# Patient Record
Sex: MALE | Race: WHITE | Employment: STUDENT | URBAN - METROPOLITAN AREA
[De-identification: names, ages, dates, MRNs, and addresses within clinical notes are randomized per-mention and may not be internally consistent; named-entity substitution may affect disease eponyms.]

---
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
## 2024-07-25 ENCOUNTER — NURSE TRIAGE (OUTPATIENT)
Dept: TELEHEALTH | Age: 19
End: 2024-07-25

## (undated) NOTE — LETTER
Date & Time: 4/22/2019, 8:48 PM  Patient: Teodora Larson  Encounter Provider(s):    MD Keon Grier APRN       To Whom It May Concern:    Gareth Evans was seen and treated in our department on 4/22/2019.  He should not participate in g

## (undated) NOTE — ED AVS SNAPSHOT
Hayley    MRN: [id number]    Department:  [address] [facility] in [place]   Date of Visit:  4/22/2019           Disclosure     Insurance plans vary and the physician(s) referred by the ER may not be covered by your plan.  Please contact tell this physician (or your personal doctor if your instructions are to return to your personal doctor) about any new or lasting problems. The primary care or specialist physician will see patients referred from the [facility] Emergency Department.  Geovany Menendez